# Patient Record
(demographics unavailable — no encounter records)

---

## 2025-01-06 NOTE — PROCEDURE
[FreeTextEntry1] : CT reviewed and compared to prior. 01/06/2025 Pulmonary function testing Normal Flow RatesNormal Lung Volumes. Diffusion capacity is normal.  PFT attached relatively stable function.

## 2025-01-06 NOTE — HISTORY OF PRESENT ILLNESS
[Never] : never [TextBox_4] : Here for f/u and PFT has cleaned bathroom of mold since last visit no respiratory illnesses got flu shot Patient presently feeling well. No significant cough, wheezing, chest pain or SOB. Feels no change in status.   No history of rheumatic disease. [place] No animals. No moist. No mold.

## 2025-01-06 NOTE — DISCUSSION/SUMMARY
[FreeTextEntry1] : Clinical and radiographic evidence of mild interstitial lung disease. Patient asymptomatic.  Stable function. Etiology unclear but likely not related to active systemic inflammatory process. Slight concern for early interstitial disease that could be progressive such as idiopathic pulmonary fibrosis.  More likely this is related to prior injury.  Does not appear to be related to dependent changes. Stable pulmonary nodules.

## 2025-01-06 NOTE — PHYSICAL EXAM
[No Acute Distress] : no acute distress [Supple] : supple [No JVD] : no jvd [Normal S1, S2] : normal s1, s2 [No Murmurs] : no murmurs [Normal to Percussion] : normal to percussion [No Abnormalities] : no abnormalities [Benign] : benign [No HSM] : no hsm [No Clubbing] : no clubbing [No Cyanosis] : no cyanosis [No Edema] : no edema [No Focal Deficits] : no focal deficits [Oriented x3] : oriented x3 [TextBox_68] : Few basilar crackles

## 2025-01-06 NOTE — CONSULT LETTER
[Dear  ___] : Dear ~NELIA, [Consult Closing:] : Thank you very much for allowing me to participate in the care of this patient.  If you have any questions, please do not hesitate to contact me. [Sincerely,] : Sincerely, [FreeTextEntry2] : Yasmin Coto MD\par   [FreeTextEntry3] : Edward Mendoza MD FCCP\par

## 2025-01-06 NOTE — ASSESSMENT
[FreeTextEntry1] : Continue observation. Follow-up 12 months F/u HRCT prior to next visit.  Put in computer as reminder. F/U sooner PRN.   35 minutes spent in evaluation management and review of studies.

## 2025-01-06 NOTE — HISTORY OF PRESENT ILLNESS
[Never] : never [TextBox_4] : Here for f/u and PFT has cleaned bathroom of mold since last visit no respiratory illnesses got flu shot Patient presently feeling well. No significant cough, wheezing, chest pain or SOB. Feels no change in status.   No history of rheumatic disease. [place] [facility] No animals. No moist. No mold.